# Patient Record
(demographics unavailable — no encounter records)

---

## 2024-11-29 NOTE — DISCUSSION/SUMMARY
[FreeTextEntry1] : 18 y/o G0 presents to discuss contraception -  we discussed all contraception options including hormonal and non-hormonal I reviewed LARCs for which the patient is not interested in at this time since the patient desires to improve her acne - i recommended combined hormonal options - of those she is most interest in OCPs. She specifically requests Kathy. I discussed the progesterone is good for acne but is more associtated with thromboembolism. The patient states she has done a lot of research about Kathy and really wants to try it.  Instructions provided for starting and taking OCPs.  She will follow-up in 3-4 months

## 2024-11-29 NOTE — DISCUSSION/SUMMARY
[FreeTextEntry1] : 20 y/o G0 presents to discuss contraception -  we discussed all contraception options including hormonal and non-hormonal I reviewed LARCs for which the patient is not interested in at this time since the patient desires to improve her acne - i recommended combined hormonal options - of those she is most interest in OCPs. She specifically requests Kathy. I discussed the progesterone is good for acne but is more associtated with thromboembolism. The patient states she has done a lot of research about Kathy and really wants to try it.  Instructions provided for starting and taking OCPs.  She will follow-up in 3-4 months

## 2024-11-29 NOTE — HISTORY OF PRESENT ILLNESS
[Menarche Age: ____] : age at menarche was [unfilled] [No] : Patient does not have concerns regarding sex [N] : Patient does not use contraception [Y] : Patient is sexually active [Currently Active] : currently active [HIV test declined] : HIV test declined [Syphilis test declined] : Syphilis test declined [Gonorrhea test declined] : Gonorrhea test declined [Chlamydia test declined] : Chlamydia test declined [Trichomonas test declined] : Trichomonas test declined [HPV test declined] : HPV test declined [Hepatitis B test declined] : Hepatitis B test declined [Hepatitis C test declined] : Hepatitis C test declined [LMPDate] : 11/9/24 [PGHxTotal] : 0 [FreeTextEntry1] : 11/09/24

## 2024-12-27 NOTE — HISTORY OF PRESENT ILLNESS
[de-identified] : Pt is coughing and congested since yesterday. No fever. Pt mentioned mom tested positive for covid 2 weeks ago.  [FreeTextEntry6] : cough and congestion since yesterday. Afebrile. Drinking well. Normal elimination. MOC was + for COVID 19 approx 2 weeks ago.

## 2024-12-27 NOTE — REVIEW OF SYSTEMS
1) Continue all current medications  2) Exercise daily at least 20-30 minutes  3) The following lifestyle modifications are encouraged: stay active  4) Low-fat, low cholesterol diet less than 1500 mg (2/3 teaspoon) sodium daily  5) READ: Stretching to Stay Young: Simple Workouts to Keep You Flexible, Energized, and Pain Free  Em Whitney; Prevent and Reverse Heart Disease  6) Podcasts:  Nutrition Rounds Podcast: Episode 3- \"Happy Heart Month! Women's Heart Health and Prevention with Isi Thompson MD\"  7) Follow-up in 6 months or sooner if needed.    Kash Banks MD, MS, FACC     [Negative] : Genitourinary

## 2024-12-27 NOTE — HISTORY OF PRESENT ILLNESS
[de-identified] : Pt is coughing and congested since yesterday. No fever. Pt mentioned mom tested positive for covid 2 weeks ago.  [FreeTextEntry6] : cough and congestion since yesterday. Afebrile. Drinking well. Normal elimination. MOC was + for COVID 19 approx 2 weeks ago.

## 2024-12-27 NOTE — DISCUSSION/SUMMARY
[FreeTextEntry1] : 19y F seen for acute visit. URI symptoms with PND and acute pharyngitis. Rapid strep, flu, COVID 19 neg. Supportive care. If TC positive, Amox BID x 10 days. RTO PRN persistent, worsening, or new concerning symptoms. nondistended/soft/nontender

## 2024-12-27 NOTE — DISCUSSION/SUMMARY
Pt. questioned about when ride was coming to pick her up. States her ride was delayed and states she is waiting for additional psychiatry and DBT referrals.  [facility] had completed discharge with return to established providers but patient claims she is no longer planning to return to providers. Met with 2nd [facility] who answered her questions about referrals. Pt. asked to leave after the conversation. Reports she wants to wait for her ride outside. Reports ride will be here shortly. Escorted off unit.    [FreeTextEntry1] : 19y F seen for acute visit. URI symptoms with PND and acute pharyngitis. Rapid strep, flu, COVID 19 neg. Supportive care. If TC positive, Amox BID x 10 days. RTO PRN persistent, worsening, or new concerning symptoms.

## 2024-12-27 NOTE — PHYSICAL EXAM
[Inflamed Nasal Mucosa] : inflamed nasal mucosa [Erythematous Oropharynx] : erythematous oropharynx [NL] : warm, clear [FreeTextEntry4] : congested

## 2025-01-16 NOTE — HISTORY OF PRESENT ILLNESS
[Stable] : stable [2] : currently ~his/her~ pain is 2 out of 10 [Standing] : standing [Daily] : ~He/She~ states the symptoms seem to be occuring daily [Running] : worsened by running [Sitting] : worsened by sitting [Walking] : worsened by walking [NSAIDs] : relieved by nonsteroidal anti-inflammatory drugs [FreeTextEntry1] : Natasha is a 19-year-old female who presents with her father to the clinic today for a postoperative visit for a posterior spinal fusion with instrumentation done on 06/07/2024.  She is doing well postoperatively. Patient is currently attending [employer] as a freshman.  She reports occasional back and rib pain with cold weather changes.  She reports occasional back pain with sitting or laying down for prolonged periods of time.  She takes occasional Tylenol/Motrin as needed for pain with good relief.  She denies fever, chills, incisional drainage.  She denies activity limitations.  She presents today with father for continued postoperative management regarding the same [Rest] : not relieved with rest

## 2025-01-16 NOTE — ASSESSMENT
[FreeTextEntry1] : Natasha is an 19-year-old female with adolescent idiopathic scoliosis.  She underwent posterior spinal fusion with instrumentation on 6/7/2024   Clinical exam and imaging reviewed with patient and family at length.  She is doing well postoperatively.  With cold weather changes is not uncommon.  She may continue with Motrin/Tylenol as needed for pain.  Activities as tolerated.  Discussed care for scar and need to keep it covered during summer. Discussed the natural history of spine fusion and time for healing. Possibility of late onset infection, nonunion, implant failure, extension of fusion, junctional arthritis, junctional kyphosis, need for implant exchange and removal and extension of fusion explained.. Patient to continue using vitamin E cream on scar.  Note for school to allow for a close parking space near the dorm has been provided.  She will return for follow-up in 6 months for repeat evaluation with AP and lateral spine x-ray at that time.   Patient is doing very well.  Patient to have no restrictions except contact and collision sports and be returned to full activity.  School note provided today.  Discussed care for scar and need to keep it covered during summer. Discussed activity limitations and modifications including collision and contact sports limitations. Discussed the natural history of spine fusion and time for healing. Possibility of late onset infection, nonunion, implant failure, extension of fusion, junctional arthritis, junctional kyphosis, need for implant exchange and removal and extension of fusion explained.. Patient to continue using vitamin E cream on scar.  Discussed need for shoulder./back strengthening.  Discussed exercises.  Patient to follow up for post op visit.  All questions answered and understanding verbalized.  Patient and family in agreement with plan. Parent served as the primary historian regarding the above information for this visit due to the unreliable nature of the patient's history.  This note was generated using Dragon medical dictation software. A reasonable effort has been made for proofreading its contents, but typos may still remain. If there are any questions or points of clarification needed please do not hesitate to contact my office.  We spent 30 minutes on HPI, Clinical exam, ordering/ reviewing all imaging, reviewing any existing record, reviewing findings and counseling patient to treatment, differentials,etiology, prognosis, natural history, implications on ADLs, activities limitations/modifications, genetics, answering questions and addressing concerns, treatment goals and documenting in the EHR.

## 2025-01-16 NOTE — REVIEW OF SYSTEMS
[Back Pain] : ~T back pain [Nl] : Genitourinary [No Acute Changes] : No acute changes since previous visit [Immunizations are up to date] : Immunizations are up to date [Change in Activity] : no change in activity [Fever Above 102] : no fever [Malaise] : no malaise [Rash] : no rash [Itching] : no itching [Eye Pain] : no eye pain [Redness] : no redness [Sore Throat] : no sore throat [Wheezing] : no wheezing [Cough] : no cough [Asthma] : no asthma [Vomiting] : no vomiting [Diarrhea] : no diarrhea [Constipation] : no constipation [Limping] : no limping [Joint Pains] : no arthralgias [Joint Swelling] : no joint swelling [Seizure] : no seizures [Diabetes] : no diabetese [Bruising] : no tendency for easy bruising [Swollen Glands] : no lymphadenopathy [Frequent Infections] : no frequent infections

## 2025-01-16 NOTE — PHYSICAL EXAM
[Normal] : Patient is awake and alert and in no acute distress [Oriented x3] : oriented to person, place, and time [FreeTextEntry1] : General: Patient is awake and alert and in no acute distress. well developed, well nourished, cooperative.  Skin: The skin is intact, warm, pink, and dry over the area examined.  Eyes: normal tinted sclera, normal eyelids and pupils were equal and round.  ENT: normal ears, normal nose and normal lips. Cardiovascular: There is brisk capillary refill in the digits of the affected extremity. They are symmetric pulses in the bilateral upper and lower extremities, positive peripheral pulses, brisk capillary refill, but no peripheral edema. Respiratory: The patient is in no apparent respiratory distress. They're taking full deep breaths without use of accessory muscles or evidence of audible wheezes or stridor without the use of a stethoscope, normal respiratory effort.  NEUROLOGICAL:  5/5 motor strength in the main muscle groups of bilateral lower extremities, sensory intact in bilateral lower extremities, 2+/symmetrical deep tendon reflexes were present in bilateral knees and bilateral Achilles Negative Babinski No clonus  SPINE:   Examination of back reveals relatively level shoulders and pelvis.  Patient appears well-balanced.  Well-healed midline spine incision with no signs or symptoms of infection.  No drainage, no erythema, no edema.  No fluid collection.  Gait: GEORGINA ambulates with a normal and steady heel-to-toe gait without assistive devices. She bears equal weight across bilateral lower extremities. No evidence of a limp. Able to walk heels and toes without difficulty.

## 2025-01-16 NOTE — DATA REVIEWED
[de-identified] : 1/16/2025: AP and lateral full-length standing spine x-ray ordered, obtained and independently reviewed by me today.  Hardware in good position.  Deformity correction maintained.  Patient appears well-balanced  06/03/2024: AP prone and left and right bending films also done for preoperative planning.  AP and lateral spine radiographs were ordered, obtained, and independently reviewed in clinic on 01/08/2024 depicting a right thoracic curve measuring 46 degrees and a left thoracolumbar curve measuring 30 degrees. Patient is Risser 5. No obvious deformity on the lateral plane. No evidence of spondylolysis or spondylolisthesis.   MRI CTL spine independently reviewed PFT reviewed

## 2025-03-11 NOTE — HISTORY OF PRESENT ILLNESS
[Oral Contraceptive] : uses oral contraception pills [Y] : Patient is sexually active [N] : Patient denies prior pregnancies [Menarche Age: ____] : age at menarche was [unfilled] [No] : Patient does not have concerns regarding sex [Currently Active] : currently active [LMPDate] : 03/03/2025 [FreeTextEntry1] : 03/03/2025

## 2025-05-14 NOTE — HISTORY OF PRESENT ILLNESS
[de-identified] : Sore throat, coughing, runny nose. Negative at home covid test. No fever, Taking Day and NightQuil  [FreeTextEntry6] : no vomiting, no diarrhea normal appetite

## 2025-05-14 NOTE — PLAN
[TextEntry] : symptomatic treatment fluids intake handwashing and infection control discussed if throat culture positive, start amoxicillin 500mg BID x10 days  recheck if worse/not improving

## 2025-05-15 NOTE — PHYSICAL EXAM

## 2025-05-15 NOTE — DISCUSSION/SUMMARY
[Met privately with the adolescent for part of the office visit?] : Met privately with the adolescent for part of the office visit? Yes [Adolescent demonstrates understanding of his/her conditions and how to take prescribed medications?] : Adolescent demonstrates understanding of his/her conditions and how to take prescribed medications? Yes [Adolescent asks questions during each office  visit and participates in the care plan?] : Adolescent asks questions during each office visit and participates in the care plan? Yes [FreeTextEntry1] : Reviewed substance abuse alcohol intake, risk factors for sexually transmitted infections, diet and exercise habits and symptoms of depression. Sleep hygiene was discussed. Limit screen time to 2 hours/day and avoid screen 1 hour prior to sleep time. Use of SPF 30 or more and tick checks discussed.  All physical exam findings and vital signs were discussed. Patient should return in 1 year for well adult check.  5210 reviewed Cardiac checklist reviewed PHQ9 reviewed CRAFFT reviewed Hearing and vision normal today QuantiFERON ordered for pre-employment requirements, routine labs.  Albuterol renewed x 1, pulm referral placed.

## 2025-05-15 NOTE — HISTORY OF PRESENT ILLNESS
[No] : Patient does not go to dentist yearly [Up to date] : Up to date [Eats regular meals including adequate fruits and vegetables] : eats regular meals including adequate fruits and vegetables [Drinks non-sweetened liquids] : drinks non-sweetened liquids  [Calcium source] : calcium source [Has friends] : has friends [At least 1 hour of physical activity a day] : at least 1 hour of physical activity a day [Has interests/participates in community activities/volunteers] : has interests/participates in community activities/volunteers. [Uses safety belts/safety equipment] : uses safety belts/safety equipment  [Yes] : Patient has had sexual intercourse. [HIV Screening Declined] : HIV Screening Declined [Sleep Concerns] : no sleep concerns [Screen time (except homework) less than 2 hours a day] : no screen time (except homework) less than 2 hours a day [Uses electronic nicotine delivery system] : does not use electronic nicotine delivery system [Exposure to electronic nicotine delivery system] : no exposure to electronic nicotine delivery system [Uses tobacco] : does not use tobacco [Exposure to tobacco] : no exposure to tobacco [Uses drugs] : does not use drugs  [Exposure to drugs] : no exposure to drugs [Drinks alcohol] : does not drink alcohol [Exposure to alcohol] : no exposure to alcohol [Has ways to cope with stress] : has ways to cope with stress [Displays self-confidence] : displays self-confidence [Has problems with sleep] : does not have problems with sleep [Gets depressed, anxious, or irritable/has mood swings] : does not get depressed, anxious, or irritable/has mood swings [Has thought about hurting self or considered suicide] : has not thought about hurting self or considered suicide [With Teen] : teen [With Parent/Guardian] : parent/guardian [FreeTextEntry7] : 20 yr [facility] [employer] [facility] [FreeTextEntry8] : Periods are irregular and heavier since scoliosis surgery. Just this month passed large clot.  [de-identified] : [employer] major  [FreeTextEntry1] : Interval history: 5/2024 scoliosis repair Dr. Palumbo. Doing well. Back to full activity. Denies pain. SOB with walking since having Covid a few years ago, using albuterol PRN. UTD cardio follow ups

## 2025-07-15 NOTE — DISCUSSION/SUMMARY
[FreeTextEntry1] : 20y F seen for acute visit. EMS activated in office for pre-syncopal episode and clinical presentation. Taken to [facility]- given fluids and given IV meds for headache. Discharged home at 2am as per MOC. Stayed home from camp today. Hydrating, feeling much better. Neurology referral entered for migraines as recommended by ED. May benefit from cardiology consultation for low resting HR upon presentation- will suggest to parent. F/U here PRN.

## 2025-07-15 NOTE — DISCUSSION/SUMMARY
[FreeTextEntry1] : 20y F seen for acute visit. EMS activated in office for pre-syncopal episode and clinical presentation. Taken to [facility]- given fluids and given IV meds for headache. Discharged home at 2am as per MOC. Stayed home from camp today. Hydrating, feeling much better. Neurology referral entered for migraines as recommended by ED. May benefit from cardiology consultation for low resting HR upon presentation- will suggest to parent. F/U here PRN.

## 2025-07-15 NOTE — PHYSICAL EXAM
[NL] : warm, clear [FreeTextEntry1] : trembling, hyperventilating, generalized panic, pallor, diaphoresis. no cyanosis.  [FreeTextEntry8] : HR 50s upon arrival. No irregularities appreciated. By end of visit, HR up to 80s- no irregularities.

## 2025-07-15 NOTE — HISTORY OF PRESENT ILLNESS
[de-identified] : Mom reports pt w dizziness, blurred vision, feeling faint x 2 days, mom reports pt collapsed after work yesterday -spent the day outside, did not hydrate well, pt collapsed again in waiting room today -sent to [facility] via [facility] [FreeTextEntry6] : dizziness, blurred vision, feeling faint, near-syncopal episodes since yesterday afternoon. 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 Did not hydrate well during the day. Did hydrate well last night. Woke up feeling better and went to the camp. Improved hydration throughout the day reported. She again feels faint, collapsed in our waiting room. Prior to yesterday, was well by report. LMP < 4 weeks ago. On OCPs for heavy menses and contraception. Denies smoking, vaping, illicit drug use. Denies other high risk behaviors. Denies restrictive eating habits.

## 2025-07-15 NOTE — HISTORY OF PRESENT ILLNESS
[de-identified] : Mom reports pt w dizziness, blurred vision, feeling faint x 2 days, mom reports pt collapsed after work yesterday -spent the day outside, did not hydrate well, pt collapsed again in waiting room today -sent to [facility] via [facility] [FreeTextEntry6] : dizziness, blurred vision, feeling faint, near-syncopal episodes since yesterday afternoon. 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 Did not hydrate well during the day. Did hydrate well last night. Woke up feeling better and went to the camp. Improved hydration throughout the day reported. She again feels faint, collapsed in our waiting room. Prior to yesterday, was well by report. LMP < 4 weeks ago. On OCPs for heavy menses and contraception. Denies smoking, vaping, illicit drug use. Denies other high risk behaviors. Denies restrictive eating habits.